# Patient Record
Sex: FEMALE | Race: OTHER | HISPANIC OR LATINO
[De-identification: names, ages, dates, MRNs, and addresses within clinical notes are randomized per-mention and may not be internally consistent; named-entity substitution may affect disease eponyms.]

---

## 2019-03-09 ENCOUNTER — APPOINTMENT (OUTPATIENT)
Dept: ULTRASOUND IMAGING | Facility: HOSPITAL | Age: 44
End: 2019-03-09

## 2019-03-09 ENCOUNTER — OUTPATIENT (OUTPATIENT)
Dept: OUTPATIENT SERVICES | Facility: HOSPITAL | Age: 44
LOS: 1 days | End: 2019-03-09
Payer: COMMERCIAL

## 2019-03-09 PROCEDURE — 76830 TRANSVAGINAL US NON-OB: CPT | Mod: 26

## 2019-03-09 PROCEDURE — 76856 US EXAM PELVIC COMPLETE: CPT | Mod: 26

## 2019-03-09 PROCEDURE — 76830 TRANSVAGINAL US NON-OB: CPT

## 2019-03-09 PROCEDURE — 76856 US EXAM PELVIC COMPLETE: CPT

## 2019-03-11 VITALS
WEIGHT: 172.84 LBS | HEART RATE: 75 BPM | HEIGHT: 68 IN | OXYGEN SATURATION: 98 % | TEMPERATURE: 99 F | SYSTOLIC BLOOD PRESSURE: 98 MMHG | DIASTOLIC BLOOD PRESSURE: 65 MMHG | RESPIRATION RATE: 16 BRPM

## 2019-03-11 NOTE — ASU PATIENT PROFILE, ADULT - PSH
Other postprocedural status  S/P appendectomy  Other postprocedural status  S/P myomectomy  Other postprocedural status  x3

## 2019-03-11 NOTE — ASU PREOP CHECKLIST - SELECT TESTS ORDERED
Type and Screen/HCG Type and Screen/POC HCG negative/HCG Type and Screen/HCG/CMP/POC HCG negative, pelvic sono/CBC/PT/PTT/INR Type and Screen/HCG/POC HCG negative, pelvic sonogram, pelvic CT/CMP/CBC/PT/PTT/INR

## 2019-03-12 ENCOUNTER — RESULT REVIEW (OUTPATIENT)
Age: 44
End: 2019-03-12

## 2019-03-12 ENCOUNTER — INPATIENT (INPATIENT)
Facility: HOSPITAL | Age: 44
LOS: 0 days | Discharge: HOME | DRG: 743 | End: 2019-03-13
Attending: OBSTETRICS & GYNECOLOGY | Admitting: OBSTETRICS & GYNECOLOGY
Payer: COMMERCIAL

## 2019-03-12 RX ORDER — ACETAMINOPHEN 500 MG
1000 TABLET ORAL EVERY 6 HOURS
Qty: 0 | Refills: 0 | Status: DISCONTINUED | OUTPATIENT
Start: 2019-03-12 | End: 2019-03-13

## 2019-03-12 RX ORDER — SIMETHICONE 80 MG/1
80 TABLET, CHEWABLE ORAL EVERY 8 HOURS
Qty: 0 | Refills: 0 | Status: DISCONTINUED | OUTPATIENT
Start: 2019-03-12 | End: 2019-03-13

## 2019-03-12 RX ORDER — DOCUSATE SODIUM 100 MG
100 CAPSULE ORAL THREE TIMES A DAY
Qty: 0 | Refills: 0 | Status: DISCONTINUED | OUTPATIENT
Start: 2019-03-12 | End: 2019-03-13

## 2019-03-12 RX ORDER — OXYCODONE HYDROCHLORIDE 5 MG/1
5 TABLET ORAL EVERY 6 HOURS
Qty: 0 | Refills: 0 | Status: DISCONTINUED | OUTPATIENT
Start: 2019-03-12 | End: 2019-03-13

## 2019-03-12 RX ORDER — SODIUM CHLORIDE 9 MG/ML
1000 INJECTION, SOLUTION INTRAVENOUS
Qty: 0 | Refills: 0 | Status: DISCONTINUED | OUTPATIENT
Start: 2019-03-12 | End: 2019-03-13

## 2019-03-12 RX ORDER — ONDANSETRON 8 MG/1
4 TABLET, FILM COATED ORAL EVERY 6 HOURS
Qty: 0 | Refills: 0 | Status: DISCONTINUED | OUTPATIENT
Start: 2019-03-12 | End: 2019-03-13

## 2019-03-12 RX ORDER — HYDROMORPHONE HYDROCHLORIDE 2 MG/ML
0.5 INJECTION INTRAMUSCULAR; INTRAVENOUS; SUBCUTANEOUS
Qty: 0 | Refills: 0 | Status: DISCONTINUED | OUTPATIENT
Start: 2019-03-12 | End: 2019-03-12

## 2019-03-12 RX ORDER — KETOROLAC TROMETHAMINE 30 MG/ML
30 SYRINGE (ML) INJECTION EVERY 6 HOURS
Qty: 0 | Refills: 0 | Status: DISCONTINUED | OUTPATIENT
Start: 2019-03-12 | End: 2019-03-13

## 2019-03-12 RX ORDER — BUPIVACAINE 13.3 MG/ML
20 INJECTION, SUSPENSION, LIPOSOMAL INFILTRATION ONCE
Qty: 0 | Refills: 0 | Status: DISCONTINUED | OUTPATIENT
Start: 2019-03-12 | End: 2019-03-13

## 2019-03-12 RX ADMIN — HYDROMORPHONE HYDROCHLORIDE 0.5 MILLIGRAM(S): 2 INJECTION INTRAMUSCULAR; INTRAVENOUS; SUBCUTANEOUS at 15:53

## 2019-03-12 RX ADMIN — Medication 100 MILLIGRAM(S): at 21:35

## 2019-03-12 RX ADMIN — SIMETHICONE 80 MILLIGRAM(S): 80 TABLET, CHEWABLE ORAL at 21:35

## 2019-03-12 RX ADMIN — OXYCODONE HYDROCHLORIDE 5 MILLIGRAM(S): 5 TABLET ORAL at 19:36

## 2019-03-12 RX ADMIN — Medication 1000 MILLIGRAM(S): at 21:35

## 2019-03-12 RX ADMIN — Medication 1000 MILLIGRAM(S): at 23:31

## 2019-03-12 RX ADMIN — HYDROMORPHONE HYDROCHLORIDE 0.5 MILLIGRAM(S): 2 INJECTION INTRAMUSCULAR; INTRAVENOUS; SUBCUTANEOUS at 15:19

## 2019-03-12 RX ADMIN — Medication 30 MILLIGRAM(S): at 23:31

## 2019-03-12 RX ADMIN — OXYCODONE HYDROCHLORIDE 5 MILLIGRAM(S): 5 TABLET ORAL at 21:09

## 2019-03-12 RX ADMIN — Medication 30 MILLIGRAM(S): at 21:35

## 2019-03-12 NOTE — PROGRESS NOTE ADULT - SUBJECTIVE AND OBJECTIVE BOX
GYN POST OP CHECK      Patient evaluated at the bedside. No acute events.  Denies CP/SOB/dizziness/nausea/vomiting/calf pain. Patient is reporting mildly increased pain. She is tolerating sips but has not yet voided or passed gas. Patient has no other complaints at this time.     Vital Signs Last 24 Hrs  T(C): 36.9 (12 Mar 2019 18:19), Max: 37.3 (12 Mar 2019 16:56)  T(F): 98.5 (12 Mar 2019 18:19), Max: 99.1 (12 Mar 2019 16:56)  HR: 80 (12 Mar 2019 18:19) (74 - 86)  BP: 107/66 (12 Mar 2019 18:19) (107/66 - 121/60)  BP(mean): 79 (12 Mar 2019 16:56) (76 - 85)  RR: 16 (12 Mar 2019 18:19) (10 - 19)  SpO2: 94% (12 Mar 2019 18:19) (94% - 99%)    Physical Exam:  Gen: No Acute Distress  Pulm: Clear to auscultation bilaterally  GI: soft, appropriately tender, mildly distended, decreased BS, no rebound, no guarding.  Dressing C/D/I  : Andujar in place  Ext: SCDs in place, wnl    I&O's Summary    12 Mar 2019 07:01  -  12 Mar 2019 19:23  --------------------------------------------------------  IN: 375 mL / OUT: 0 mL / NET: 375 mL      MEDICATIONS  (STANDING):  acetaminophen   Tablet .. 1000 milliGRAM(s) Oral every 6 hours  BUpivacaine liposome 1.3% Injectable (no eMAR) 20 milliLiter(s) Local Injection once  docusate sodium 100 milliGRAM(s) Oral three times a day  ketorolac   Injectable 30 milliGRAM(s) IV Push every 6 hours  lactated ringers. 1000 milliLiter(s) (125 mL/Hr) IV Continuous <Continuous>  simethicone 80 milliGRAM(s) Chew every 8 hours    MEDICATIONS  (PRN):  HYDROmorphone  Injectable 0.5 milliGRAM(s) IV Push every 15 minutes PRN Severe Pain (7 - 10)  ondansetron Injectable 4 milliGRAM(s) IV Push every 6 hours PRN Nausea and/or Vomiting  oxyCODONE    IR 5 milliGRAM(s) Oral every 6 hours PRN Moderate Pain (4 - 6)    Allergies    penicillin (Rash)  Zyrtec (Unknown)    Intolerances        LABS:                    O:   T(C): 36.9 (03-12-19 @ 18:19), Max: 37.3 (03-12-19 @ 16:56)  HR: 80 (03-12-19 @ 18:19) (74 - 86)  BP: 107/66 (03-12-19 @ 18:19) (107/66 - 121/60)  RR: 16 (03-12-19 @ 18:19) (10 - 19)  SpO2: 94% (03-12-19 @ 18:19) (94% - 99%)  Wt(kg): --    GEN: patient appears well  LUNGS: no respiratory distress  ABD:  Pelvic:   EXT: no calf tenderness        03-12 @ 07:01  -  03-12 @ 19:21  --------------------------------------------------------  IN: 375 mL / OUT: 0 mL / NET: 375 mL              Assessment and Plan:     1. ID:   2. FEN/GI - Regular diet as tolerated.   3. PAIN - Controlled with oral pain medications  4.  - Voiding spontaneously.    5. RESP -  No acute issues.   6. VTE prophylaxis - SCDs, ambulate as tolerated.   7. LABS -    8. DISPO -

## 2019-03-12 NOTE — BRIEF OPERATIVE NOTE - OPERATION/FINDINGS
Significant adhesions of the bowel to the anterior abdominal wall, pelvic side wall and uterus. Large, fibroid uterus. Dilated left fallopian tube. Left ovary significantly adhered to pelvic side wall. Left ovarian cyst identified with intraoperative laparoscopic US.

## 2019-03-12 NOTE — BRIEF OPERATIVE NOTE - NSICDXBRIEFPOSTOP_GEN_ALL_CORE_FT
POST-OP DIAGNOSIS:  Pelvic adhesions 12-Mar-2019 20:51:06  Amber Magana  Cyst, ovarian 12-Mar-2019 20:50:51 left Amber Magana  Hydrosalpinx 12-Mar-2019 20:50:19 left Amber Magana

## 2019-03-12 NOTE — PROGRESS NOTE ADULT - SUBJECTIVE AND OBJECTIVE BOX
POST-OPERATIVE NOTE    Procedure: Diagnostic Laparotomy, lysis of pelvic adhesions,     Diagnosis/Indication: Pelvic adhesions, Ovarian cyst    Surgeon: Dr. Remy    S: Pt has no complaints. Denies CP, SOB, MCCLENDON, calf tenderness. Pain controlled with medication. Denies nausea, vomiting.    O:  T(C): 37.1 (03-12-19 @ 21:00), Max: 37.1 (03-12-19 @ 21:00)  T(F): 98.8 (03-12-19 @ 21:00), Max: 98.8 (03-12-19 @ 21:00)  HR: 79 (03-12-19 @ 21:00) (79 - 79)  BP: 100/63 (03-12-19 @ 21:00) (100/63 - 100/63)  RR: 16 (03-12-19 @ 21:00) (16 - 16)  SpO2: 98% (03-12-19 @ 21:00) (98% - 98%)  Wt(kg): --    Gen: NAD, resting comfortably in bed  C/V: NSR  Pulm: Nonlabored breathing, no respiratory distress  Abd: soft, NT/ND, incision areas are clean, dry and intact  Extrem: WWP, no calf edema or tenderness, SCDs in place    A/P: 43y Female s/p above procedure  Diet: CLD  IVF: LR @ 125cc/hr  Pain/nausea control  SQH/SCDs/OOBA/IS  Dispo pending pain control, PO tolerance, clinical improvement

## 2019-03-12 NOTE — BRIEF OPERATIVE NOTE - NSICDXBRIEFPROCEDURE_GEN_ALL_CORE_FT
PROCEDURES:  Laparoscopic ovarian cystectomy 12-Mar-2019 20:49:44  Amber Magana  Salpingectomy, left 12-Mar-2019 20:49:35  Amber Magana  Laparoscopy, diagnostic, female 12-Mar-2019 20:49:21  Amber Magana  Lysis of pelvic adhesions 12-Mar-2019 20:49:08  Amber Magana

## 2019-03-13 ENCOUNTER — TRANSCRIPTION ENCOUNTER (OUTPATIENT)
Age: 44
End: 2019-03-13

## 2019-03-13 VITALS
RESPIRATION RATE: 17 BRPM | TEMPERATURE: 98 F | OXYGEN SATURATION: 99 % | HEART RATE: 61 BPM | SYSTOLIC BLOOD PRESSURE: 109 MMHG | DIASTOLIC BLOOD PRESSURE: 70 MMHG

## 2019-03-13 LAB
BASOPHILS # BLD AUTO: 0.05 K/UL — SIGNIFICANT CHANGE UP (ref 0–0.2)
BASOPHILS NFR BLD AUTO: 0.5 % — SIGNIFICANT CHANGE UP (ref 0–2)
EOSINOPHIL # BLD AUTO: 0.1 K/UL — SIGNIFICANT CHANGE UP (ref 0–0.5)
EOSINOPHIL NFR BLD AUTO: 1.1 % — SIGNIFICANT CHANGE UP (ref 0–6)
EXTRA GREEN TOP TUBE: SIGNIFICANT CHANGE UP
HCT VFR BLD CALC: 31.7 % — LOW (ref 34.5–45)
HGB BLD-MCNC: 10.4 G/DL — LOW (ref 11.5–15.5)
IMM GRANULOCYTES NFR BLD AUTO: 0.2 % — SIGNIFICANT CHANGE UP (ref 0–1.5)
LYMPHOCYTES # BLD AUTO: 1.98 K/UL — SIGNIFICANT CHANGE UP (ref 1–3.3)
LYMPHOCYTES # BLD AUTO: 21.2 % — SIGNIFICANT CHANGE UP (ref 13–44)
MCHC RBC-ENTMCNC: 32.8 GM/DL — SIGNIFICANT CHANGE UP (ref 32–36)
MCHC RBC-ENTMCNC: 34 PG — SIGNIFICANT CHANGE UP (ref 27–34)
MCV RBC AUTO: 103.6 FL — HIGH (ref 80–100)
MONOCYTES # BLD AUTO: 0.82 K/UL — SIGNIFICANT CHANGE UP (ref 0–0.9)
MONOCYTES NFR BLD AUTO: 8.8 % — SIGNIFICANT CHANGE UP (ref 2–14)
NEUTROPHILS # BLD AUTO: 6.39 K/UL — SIGNIFICANT CHANGE UP (ref 1.8–7.4)
NEUTROPHILS NFR BLD AUTO: 68.2 % — SIGNIFICANT CHANGE UP (ref 43–77)
NRBC # BLD: 0 /100 WBCS — SIGNIFICANT CHANGE UP (ref 0–0)
PLATELET # BLD AUTO: 266 K/UL — SIGNIFICANT CHANGE UP (ref 150–400)
RBC # BLD: 3.06 M/UL — LOW (ref 3.8–5.2)
RBC # FLD: 12.9 % — SIGNIFICANT CHANGE UP (ref 10.3–14.5)
WBC # BLD: 9.36 K/UL — SIGNIFICANT CHANGE UP (ref 3.8–10.5)
WBC # FLD AUTO: 9.36 K/UL — SIGNIFICANT CHANGE UP (ref 3.8–10.5)

## 2019-03-13 RX ADMIN — Medication 1000 MILLIGRAM(S): at 10:00

## 2019-03-13 RX ADMIN — Medication 30 MILLIGRAM(S): at 03:20

## 2019-03-13 RX ADMIN — Medication 1000 MILLIGRAM(S): at 03:20

## 2019-03-13 RX ADMIN — Medication 30 MILLIGRAM(S): at 05:07

## 2019-03-13 RX ADMIN — Medication 1000 MILLIGRAM(S): at 05:07

## 2019-03-13 RX ADMIN — Medication 30 MILLIGRAM(S): at 09:15

## 2019-03-13 RX ADMIN — Medication 30 MILLIGRAM(S): at 09:00

## 2019-03-13 RX ADMIN — Medication 1000 MILLIGRAM(S): at 09:00

## 2019-03-13 RX ADMIN — ONDANSETRON 4 MILLIGRAM(S): 8 TABLET, FILM COATED ORAL at 00:32

## 2019-03-13 RX ADMIN — OXYCODONE HYDROCHLORIDE 5 MILLIGRAM(S): 5 TABLET ORAL at 01:38

## 2019-03-13 RX ADMIN — Medication 100 MILLIGRAM(S): at 06:43

## 2019-03-13 RX ADMIN — SIMETHICONE 80 MILLIGRAM(S): 80 TABLET, CHEWABLE ORAL at 03:21

## 2019-03-13 RX ADMIN — SIMETHICONE 80 MILLIGRAM(S): 80 TABLET, CHEWABLE ORAL at 12:36

## 2019-03-13 RX ADMIN — OXYCODONE HYDROCHLORIDE 5 MILLIGRAM(S): 5 TABLET ORAL at 03:19

## 2019-03-13 NOTE — DISCHARGE NOTE NURSING/CASE MANAGEMENT/SOCIAL WORK - NSDCDPATPORTLINK_GEN_ALL_CORE
You can access the [facility] Patient Portal, offered by [facility], by registering with the following website: [email or web address]

## 2019-03-13 NOTE — DISCHARGE NOTE PROVIDER - NSDCFUADDINST_GEN_ALL_CORE_FT
- Nothing in vagina - no intercourse, tampons, or douching until cleared by your doctor.   - Avoid swimming, tub baths, strenuous activity and heavy lifting until cleared by your doctor.   - Showering is ok.   - Continue oral pain medications as needed for pain.    - Follow up in office in 1-2 weeks for your postoperative visit.  Call the office to confirm your follow up appointment.   - Call the office sooner if you develop any fever, heavy bleeding, or severe pain.  Go to the closest emergency room for any of these symptoms if you are not able to contact your doctor.

## 2019-03-13 NOTE — PROGRESS NOTE ADULT - ASSESSMENT
43 yoF s/p diagnostic laparoscopy with extensive lysis of adhesions and left salpingectomy and ovarian cystectomy.    Care per gyn  Team 1C will follow  Discussed with Dr. Remy

## 2019-03-13 NOTE — PROGRESS NOTE ADULT - SUBJECTIVE AND OBJECTIVE BOX
GYN PROGRESS NOTE    Patient evaluated at the bedside. No acute events. Patient reports that her pain is controlled but that she was not able to fall asleep until 2 am. She has been walking without assistance, voiding spontaneously and is passing flatus. Patient is tolerating clears and would like to eat a regular diet.   Denies CP/SOB/dizziness/nausea/vomiting/calf pain.    O:   T(C): 36.8 (03-13-19 @ 05:02), Max: 37.3 (03-12-19 @ 16:56)  HR: 68 (03-13-19 @ 05:02) (65 - 86)  BP: 101/65 (03-13-19 @ 05:02) (100/63 - 121/60)  RR: 17 (03-13-19 @ 05:02) (10 - 19)  SpO2: 99% (03-13-19 @ 05:02) (94% - 99%)  Wt(kg): --    GEN: patient appears well, sleeping peacefully   LUNGS: no respiratory distress  ABD: soft, appropriately tender, not distended  EXT: no calf tenderness        03-12 @ 07:01  -  03-13 @ 06:53  --------------------------------------------------------  IN: 2265 mL / OUT: 2500 mL / NET: -235 mL

## 2019-03-13 NOTE — DISCHARGE NOTE PROVIDER - HOSPITAL COURSE
Patient is status post a diagnostic laparoscopy with extensive lysis of adhesions, left salpingectomy and left ovarian cystectomy for pelvic pain. She had an uncomplicated postoperative course and has met her postoperative milestones appropriately.  Vitals are stable for discharge.

## 2019-03-13 NOTE — PROGRESS NOTE ADULT - SUBJECTIVE AND OBJECTIVE BOX
STATUS POST:  Laparoscopic ovarian cystectomy  Salpingectomy, left  Laparoscopy, diagnostic, female  Lysis of pelvic adhesions      POST OPERATIVE DAY #: 1    SUBJECTIVE:   No acute events overnight.   Patient  pain controlled, out of bed ambulating.  Tolerating CLD. Denies nausea/vomiting  +Flatus    ---------------------------------------------------------------    Vital Signs Last 24 Hrs  T(C): 36.7 (13 Mar 2019 08:43), Max: 37.3 (12 Mar 2019 16:56)  T(F): 98.1 (13 Mar 2019 08:43), Max: 99.1 (12 Mar 2019 16:56)  HR: 61 (13 Mar 2019 08:43) (61 - 86)  BP: 109/70 (13 Mar 2019 08:43) (100/63 - 121/60)  BP(mean): 79 (12 Mar 2019 16:56) (76 - 85)  RR: 17 (13 Mar 2019 08:43) (10 - 19)  SpO2: 99% (13 Mar 2019 08:43) (94% - 99%)    I&O's Summary    12 Mar 2019 07:01  -  13 Mar 2019 07:00  --------------------------------------------------------  IN: 2265 mL / OUT: 2500 mL / NET: -235 mL    13 Mar 2019 07:01  -  13 Mar 2019 13:02  --------------------------------------------------------  IN: 0 mL / OUT: 1700 mL / NET: -1700 mL        ----------------------------------------------------------------    Physical Exam:  General: NAD, Comfortable in bed     Neuro: A&Ox3  Respiratory: nonlabored breathing, no respiratory distress    Abd: soft, nontender, nondistended,  incisions clean dry intact, without erythema, drainage, or malodor   Extremities: WWP, nonedematous, SCDs in place          -------------------------------------------------------------------    LABS:                        10.4   9.36  )-----------( 266      ( 13 Mar 2019 07:13 )             31.7                   RADIOLOGY & ADDITIONAL STUDIES:

## 2019-03-13 NOTE — DISCHARGE NOTE PROVIDER - CARE PROVIDER_API CALL
Vladislav Anderson)  Obstetrics and Gynecology  [address] [place] [address], [address] [place] [address]  Phone: [phone]  Fax: [phone]  Follow Up Time:

## 2019-03-13 NOTE — PROGRESS NOTE ADULT - SUBJECTIVE AND OBJECTIVE BOX
No complaints. Pain is better now  AVSS  Lungs clear  Abdomen soft, wounds healing well  Voiding freely  Hb 10.4    Plan  OOB  Regular diet  May D/C home today

## 2019-03-13 NOTE — DISCHARGE NOTE NURSING/CASE MANAGEMENT/SOCIAL WORK - NURSING SECTION COMPLETE
Quality 226: Preventive Care And Screening: Tobacco Use: Screening And Cessation Intervention: Patient screened for tobacco and never smoked Detail Level: Detailed Patient/Caregiver provided printed discharge information.

## 2019-03-13 NOTE — PROGRESS NOTE ADULT - ASSESSMENT
Assessment and Plan: 42 yo  s/p diagnostic laparoscopy with extensive lysis of adhesions and left salpingectomy and ovarian cystectomy - POD1. Patient is clinically stable and meeting postoperative milestones appropriately.     1. ID: No acute issues.   2. FEN/GI - Passing flatus. Will advance as tolerated.   3. PAIN - Controlled with oral pain medications  4.  - Voiding spontaneously.    5. RESP -  No acute issues.   6. VTE prophylaxis - SCDs, ambulate as tolerated.   7. LABS -  Pending this morning  8. DISPO -   Likely today.

## 2019-03-13 NOTE — DISCHARGE NOTE PROVIDER - NSDCACTIVITY_GEN_ALL_CORE
Stairs allowed/Walking - Indoors allowed/No heavy lifting/straining/Return to Work/School allowed/Showering allowed

## 2019-03-14 PROCEDURE — 88304 TISSUE EXAM BY PATHOLOGIST: CPT

## 2019-03-14 PROCEDURE — 86900 BLOOD TYPING SEROLOGIC ABO: CPT

## 2019-03-14 PROCEDURE — 86850 RBC ANTIBODY SCREEN: CPT

## 2019-03-14 PROCEDURE — 85025 COMPLETE CBC W/AUTO DIFF WBC: CPT

## 2019-03-14 PROCEDURE — 36415 COLL VENOUS BLD VENIPUNCTURE: CPT

## 2019-03-14 PROCEDURE — 86901 BLOOD TYPING SEROLOGIC RH(D): CPT

## 2019-03-17 LAB — SURGICAL PATHOLOGY STUDY: SIGNIFICANT CHANGE UP

## 2019-03-22 DIAGNOSIS — Z88.0 ALLERGY STATUS TO PENICILLIN: ICD-10-CM

## 2019-03-22 DIAGNOSIS — N83.202 UNSPECIFIED OVARIAN CYST, LEFT SIDE: ICD-10-CM

## 2019-03-22 DIAGNOSIS — F17.210 NICOTINE DEPENDENCE, CIGARETTES, UNCOMPLICATED: ICD-10-CM

## 2019-03-22 DIAGNOSIS — N70.11 CHRONIC SALPINGITIS: ICD-10-CM

## 2019-03-22 DIAGNOSIS — N73.6 FEMALE PELVIC PERITONEAL ADHESIONS (POSTINFECTIVE): ICD-10-CM

## 2020-01-10 PROBLEM — N83.209 UNSPECIFIED OVARIAN CYST, UNSPECIFIED SIDE: Chronic | Status: ACTIVE | Noted: 2019-03-11

## 2020-02-27 ENCOUNTER — APPOINTMENT (OUTPATIENT)
Dept: PLASTIC SURGERY | Facility: CLINIC | Age: 45
End: 2020-02-27

## 2020-08-23 ENCOUNTER — APPOINTMENT (OUTPATIENT)
Dept: DISASTER EMERGENCY | Facility: CLINIC | Age: 45
End: 2020-08-23

## 2020-08-23 DIAGNOSIS — Z01.818 ENCOUNTER FOR OTHER PREPROCEDURAL EXAMINATION: ICD-10-CM

## 2020-08-24 ENCOUNTER — APPOINTMENT (OUTPATIENT)
Dept: COLORECTAL SURGERY | Facility: CLINIC | Age: 45
End: 2020-08-24
Payer: COMMERCIAL

## 2020-08-24 VITALS
BODY MASS INDEX: 24.59 KG/M2 | HEIGHT: 69 IN | SYSTOLIC BLOOD PRESSURE: 106 MMHG | TEMPERATURE: 97.9 F | WEIGHT: 166 LBS | DIASTOLIC BLOOD PRESSURE: 72 MMHG | HEART RATE: 79 BPM

## 2020-08-24 DIAGNOSIS — R19.4 CHANGE IN BOWEL HABIT: ICD-10-CM

## 2020-08-24 PROCEDURE — 99204 OFFICE O/P NEW MOD 45 MIN: CPT | Mod: 25

## 2020-08-24 PROCEDURE — 45300 PROCTOSIGMOIDOSCOPY DX: CPT

## 2020-08-24 RX ORDER — NEOMYCIN SULFATE 500 MG/1
500 TABLET ORAL
Qty: 6 | Refills: 0 | Status: ACTIVE | COMMUNITY
Start: 2020-08-24 | End: 1900-01-01

## 2020-08-24 RX ORDER — METRONIDAZOLE 500 MG/1
500 TABLET ORAL
Qty: 6 | Refills: 0 | Status: ACTIVE | COMMUNITY
Start: 2020-08-24 | End: 1900-01-01

## 2020-08-24 NOTE — HISTORY OF PRESENT ILLNESS
[FreeTextEntry1] : 43 yo F presents for evaluation of \par Referred by Dr. Vladislva Mckeon (GYN)\par \par PSH myomectomy 8 years ago 2/2 fibroids and appendectomy\par c/o left abdominal pain, bloating, nausea, change in BHs including constipation and bloating for the last 3 weeks. Seen by GYN, TA/TV US completed on 8/14/20:\par Impression:\par 1: upper normal size uterus with 3 discrete fibroirds\par 2: enlarged L ovary attributable to a simple follicular unilocular benign cyst and suspect small hydrosalpinx\par 3: f/u pelvic US in 10-12 weeks to verify stability vs resolution\par Scheduled for robotic myomectomy and lysis of adhesions on Wednesday at [facility]\par Reports appetite present, however has been eating less due to nausea\par Denies fever, BPR\par Has tried increasing bran intake and takes docusate two capsules once daily w/ normalization of regular BM pattern of once daily for the last two day\par BH: once daily\par Reports adequate dietary fiber and water intake\par \par Never had a colonoscopy\par Denies FMH colorectal CA, Mother w/ left sided breast CA\par Denies ASA/NSAID use

## 2020-08-24 NOTE — PHYSICAL EXAM
[Abdomen Tenderness] : ~T No ~M abdominal tenderness [Abdomen Masses] : No abdominal masses [Normal] : was normal [No HSM] : no hepatosplenomegaly [None] : there was no rectal mass  [FreeTextEntry1] : A rigid proctosigmoidoscope was passed through he anus into the rectum to 16  cm. . The mucosal surface were inspected. The patient tolerated the procedure well.\par \par The findings revealed:\par no masses or lesions.\par mild/mod internal hemorrhoids.\par

## 2020-08-24 NOTE — ASSESSMENT
[FreeTextEntry1] : I have detailed to the patient that I will be available to assist in her operation if Dr. Nissen encounters intra-abdominal adhesions or bowel involvement to his gynecological surgical site. I have discussed with her the role of possible lysis of adhesions, conversion to open surgery, and need for small or large bowel resection, and the potential need for a ileostomy or colostomy creation. I have also discussed with her the risks, benefits and alternatives of the treatment including but not limited to anastomotic leak requiring secondary procedures including ostomy creation, bleeding, infection, and the need for future procedures. Bowel preparation nstructions were given.\par \par All questions answered

## 2020-08-25 ENCOUNTER — TRANSCRIPTION ENCOUNTER (OUTPATIENT)
Age: 45
End: 2020-08-25

## 2020-08-26 ENCOUNTER — INPATIENT (INPATIENT)
Facility: HOSPITAL | Age: 45
LOS: 1 days | Discharge: HOME | DRG: 743 | End: 2020-08-28
Attending: OBSTETRICS & GYNECOLOGY | Admitting: OBSTETRICS & GYNECOLOGY
Payer: COMMERCIAL

## 2020-08-26 ENCOUNTER — RESULT REVIEW (OUTPATIENT)
Age: 45
End: 2020-08-26

## 2020-08-26 VITALS
SYSTOLIC BLOOD PRESSURE: 93 MMHG | DIASTOLIC BLOOD PRESSURE: 60 MMHG | WEIGHT: 167.33 LBS | TEMPERATURE: 97 F | RESPIRATION RATE: 16 BRPM | OXYGEN SATURATION: 99 % | HEART RATE: 66 BPM | HEIGHT: 68 IN

## 2020-08-26 DIAGNOSIS — Z98.890 OTHER SPECIFIED POSTPROCEDURAL STATES: Chronic | ICD-10-CM

## 2020-08-26 PROCEDURE — 45330 DIAGNOSTIC SIGMOIDOSCOPY: CPT | Mod: GC

## 2020-08-26 PROCEDURE — 44180 LAP ENTEROLYSIS: CPT | Mod: GC

## 2020-08-26 PROCEDURE — 88305 TISSUE EXAM BY PATHOLOGIST: CPT | Mod: 26

## 2020-08-26 RX ORDER — BUPIVACAINE 13.3 MG/ML
20 INJECTION, SUSPENSION, LIPOSOMAL INFILTRATION ONCE
Refills: 0 | Status: DISCONTINUED | OUTPATIENT
Start: 2020-08-26 | End: 2020-08-28

## 2020-08-26 RX ORDER — SODIUM CHLORIDE 9 MG/ML
1000 INJECTION, SOLUTION INTRAVENOUS
Refills: 0 | Status: DISCONTINUED | OUTPATIENT
Start: 2020-08-26 | End: 2020-08-27

## 2020-08-26 RX ORDER — HYDROMORPHONE HYDROCHLORIDE 2 MG/ML
0.5 INJECTION INTRAMUSCULAR; INTRAVENOUS; SUBCUTANEOUS
Refills: 0 | Status: DISCONTINUED | OUTPATIENT
Start: 2020-08-26 | End: 2020-08-28

## 2020-08-26 RX ORDER — ENOXAPARIN SODIUM 100 MG/ML
40 INJECTION SUBCUTANEOUS EVERY 24 HOURS
Refills: 0 | Status: DISCONTINUED | OUTPATIENT
Start: 2020-08-27 | End: 2020-08-28

## 2020-08-26 RX ORDER — KETOROLAC TROMETHAMINE 30 MG/ML
30 SYRINGE (ML) INJECTION EVERY 8 HOURS
Refills: 0 | Status: DISCONTINUED | OUTPATIENT
Start: 2020-08-26 | End: 2020-08-27

## 2020-08-26 RX ORDER — METOCLOPRAMIDE HCL 10 MG
10 TABLET ORAL EVERY 8 HOURS
Refills: 0 | Status: DISCONTINUED | OUTPATIENT
Start: 2020-08-26 | End: 2020-08-28

## 2020-08-26 RX ORDER — OXYCODONE HYDROCHLORIDE 5 MG/1
5 TABLET ORAL
Refills: 0 | Status: DISCONTINUED | OUTPATIENT
Start: 2020-08-26 | End: 2020-08-28

## 2020-08-26 RX ORDER — SIMETHICONE 80 MG/1
80 TABLET, CHEWABLE ORAL EVERY 6 HOURS
Refills: 0 | Status: DISCONTINUED | OUTPATIENT
Start: 2020-08-26 | End: 2020-08-28

## 2020-08-26 RX ORDER — ONDANSETRON 8 MG/1
8 TABLET, FILM COATED ORAL EVERY 8 HOURS
Refills: 0 | Status: DISCONTINUED | OUTPATIENT
Start: 2020-08-26 | End: 2020-08-28

## 2020-08-26 RX ORDER — HYDROMORPHONE HYDROCHLORIDE 2 MG/ML
0.5 INJECTION INTRAMUSCULAR; INTRAVENOUS; SUBCUTANEOUS
Refills: 0 | Status: DISCONTINUED | OUTPATIENT
Start: 2020-08-26 | End: 2020-08-26

## 2020-08-26 RX ORDER — MAGNESIUM SULFATE 500 MG/ML
2 VIAL (ML) INJECTION
Refills: 0 | Status: DISCONTINUED | OUTPATIENT
Start: 2020-08-26 | End: 2020-08-28

## 2020-08-26 RX ORDER — ACETAMINOPHEN 500 MG
1000 TABLET ORAL EVERY 6 HOURS
Refills: 0 | Status: DISCONTINUED | OUTPATIENT
Start: 2020-08-26 | End: 2020-08-28

## 2020-08-26 RX ADMIN — HYDROMORPHONE HYDROCHLORIDE 0.5 MILLIGRAM(S): 2 INJECTION INTRAMUSCULAR; INTRAVENOUS; SUBCUTANEOUS at 20:33

## 2020-08-26 RX ADMIN — HYDROMORPHONE HYDROCHLORIDE 0.5 MILLIGRAM(S): 2 INJECTION INTRAMUSCULAR; INTRAVENOUS; SUBCUTANEOUS at 20:16

## 2020-08-26 RX ADMIN — HYDROMORPHONE HYDROCHLORIDE 0.5 MILLIGRAM(S): 2 INJECTION INTRAMUSCULAR; INTRAVENOUS; SUBCUTANEOUS at 18:51

## 2020-08-26 RX ADMIN — Medication 30 MILLIGRAM(S): at 23:19

## 2020-08-26 RX ADMIN — Medication 1000 MILLIGRAM(S): at 23:19

## 2020-08-26 RX ADMIN — SODIUM CHLORIDE 125 MILLILITER(S): 9 INJECTION, SOLUTION INTRAVENOUS at 19:52

## 2020-08-26 RX ADMIN — HYDROMORPHONE HYDROCHLORIDE 0.5 MILLIGRAM(S): 2 INJECTION INTRAMUSCULAR; INTRAVENOUS; SUBCUTANEOUS at 20:17

## 2020-08-26 NOTE — BRIEF OPERATIVE NOTE - NSICDXBRIEFPOSTOP_GEN_ALL_CORE_FT
POST-OP DIAGNOSIS:  Fibroid uterus 26-Aug-2020 16:25:19  Carlos Arteaga  Ovarian cyst 26-Aug-2020 16:25:07  Carlos Arteaga
POST-OP DIAGNOSIS:  Fibroid uterus 26-Aug-2020 16:25:19  Carlos Arteaga  Ovarian cyst 26-Aug-2020 16:25:07  Carlos Arteaga

## 2020-08-26 NOTE — H&P ADULT - HISTORY OF PRESENT ILLNESS
43yo  with LMP  who presents for scheduled robotic assisted laparoscopic myomectomy for fibroids. 45yo  with LMP  who presents for scheduled robotic assisted laparoscopic myomectomy for fibroids and pelvic pain.    ObHx: SAB x3  GynHx: fibroids s/p prior l/s myomectomies  and ; L ovarian cyst  PMH: denies  PSH: l/s appendectomy ; l/s myomectomy , ; l/s XI   NKDA  Meds: denies  Social: smokes 1-2 cigarettes/day for 15 yrs 45yo  with LMP  who presents for scheduled robotic assisted laparoscopic myomectomy for fibroids, menorrhagia, and pelvic pain.    ObHx: SAB x3  GynHx: fibroids s/p prior l/s myomectomies  and ; L ovarian cyst  PMH: denies  PSH: l/s appendectomy ; l/s myomectomy , ; l/s XI   NKDA  Meds: denies  Social: smokes 1-2 cigarettes/day for 15 yrs

## 2020-08-26 NOTE — BRIEF OPERATIVE NOTE - NSICDXBRIEFPROCEDURE_GEN_ALL_CORE_FT
PROCEDURES:  Laparoscopic cystectomy 26-Aug-2020 18:53:11 R ovarian cyst ruptured Maggie Stout  Myomectomy, laparoscopic, 1-4 myomas 26-Aug-2020 18:52:57  Maggie Stout
PROCEDURES:  Laparoscopic enterolysis 26-Aug-2020 16:24:30  Carlos Arteaga

## 2020-08-26 NOTE — PROGRESS NOTE ADULT - ASSESSMENT
A: 43yo s/p RA l/s cystectomy, myomectomy, XI, flex sigmoidoscopy    Plan:  1. Vital signs stable, continue to monitor per protocol  2. Pain control: tylenol/toradol  3. DVT prophylaxis: SCDs  4. Pulm: Incentive spirometer (at least 10 times per hour while awake)   5. GI: Diet regular   6. : s/p benitez, voiding spontaneously   7. Follow up labs: AM CBC  8. Activity: bedrest tonight

## 2020-08-26 NOTE — H&P ADULT - ASSESSMENT
A/P: 43yo  with LMP  who presents for scheduled robotic assisted laparoscopic myomectomy for fibroids and pelvic pain, now POD0 s/p laparoscopic lysis of adhesions and myomectomy. Will admit for post-op pain control and observation. Toradol/tylenol standing, oxy prn.    d/w Dr Anderson

## 2020-08-26 NOTE — H&P ADULT - NSICDXPASTSURGICALHX_GEN_ALL_CORE_FT
PAST SURGICAL HISTORY:  Other postprocedural status S/P appendectomy 1994    Other postprocedural status S/P myomectomy 2009 and 2014    S/P laparoscopic surgery lysis of adhesions 2019

## 2020-08-26 NOTE — BRIEF OPERATIVE NOTE - NSICDXBRIEFPREOP_GEN_ALL_CORE_FT
PRE-OP DIAGNOSIS:  Ovarian cyst 26-Aug-2020 16:24:53  Carlos Arteaga  Fibroid uterus 26-Aug-2020 16:24:42  Carlos Arteaga
PRE-OP DIAGNOSIS:  Ovarian cyst 26-Aug-2020 16:24:53  Carlos Arteaga  Fibroid uterus 26-Aug-2020 16:24:42  Carlos Arteaga

## 2020-08-26 NOTE — PACU DISCHARGE NOTE - COMMENTS
Patient is hemodynamically stable and reports discomfort manageable.  Meets PACU discharge criteria.  Report given to unit RN.  Patient transported via bed to unit.  Accompanied by CNAx2

## 2020-08-26 NOTE — PROGRESS NOTE ADULT - SUBJECTIVE AND OBJECTIVE BOX
GYN Post Op Check     Patient seen at bedside.  Pain controlled. Tolerating cracker and jello. Patient passed TOV, is passing gas and has been OOB. ce.  Denies CP, palpitations, SOB, fever, chills, nausea, vomiting.    Vital Signs Last 24 Hrs  T(C): 36.6 (26 Aug 2020 22:00), Max: 36.6 (26 Aug 2020 22:00)  T(F): 97.9 (26 Aug 2020 22:00), Max: 97.9 (26 Aug 2020 22:00)  HR: 80 (26 Aug 2020 22:00) (66 - 85)  BP: 113/56 (26 Aug 2020 22:00) (93/60 - 126/68)  BP(mean): 85 (26 Aug 2020 21:20) (81 - 88)  RR: 15 (26 Aug 2020 22:00) (13 - 22)  SpO2: 100% (26 Aug 2020 22:00) (99% - 100%)    Physical Exam:  Gen: No Acute Distress  Pulm: Clear to auscultation bilaterally  GI: soft, appropriately tender, mildly distended, decreased BS, no rebound, no guarding.  incisions c/d/i  Ext: SCDs in place, wnl    I&O's Summary    26 Aug 2020 07:01  -  26 Aug 2020 23:42  --------------------------------------------------------  IN: 125 mL / OUT: 300 mL / NET: -175 mL      MEDICATIONS  (STANDING):  acetaminophen   Tablet .. 1000 milliGRAM(s) Oral every 6 hours  BUpivacaine liposome 1.3% Injectable (no eMAR) 20 milliLiter(s) Local Injection once  ketorolac   Injectable 30 milliGRAM(s) IV Push every 8 hours  lactated ringers. 1000 milliLiter(s) (125 mL/Hr) IV Continuous <Continuous>  magnesium sulfate  IVPB 2 Gram(s) IV Intermittent every 1 hour    MEDICATIONS  (PRN):  acetaminophen   Tablet .. 1000 milliGRAM(s) Oral every 6 hours PRN Mild Pain (1 - 3)  HYDROmorphone  Injectable 0.5 milliGRAM(s) IV Push every 15 minutes PRN Severe Pain (7 - 10)  metoclopramide 10 milliGRAM(s) Oral every 8 hours PRN Nausea and/or Vomiting  ondansetron    Tablet 8 milliGRAM(s) Oral every 8 hours PRN Nausea and/or Vomiting  oxyCODONE    IR 5 milliGRAM(s) Oral every 3 hours PRN Moderate Pain (4 - 6)  simethicone 80 milliGRAM(s) Chew every 6 hours PRN Gas    Allergies    penicillin (Rash)  Zyrtec (Rash)    Intolerances        LABS:

## 2020-08-26 NOTE — BRIEF OPERATIVE NOTE - OPERATION/FINDINGS
Diagnostic robotic assisted laparoscopy performed which showed significant adhesions between the bowel/omentum and posterior aspect of the uterus, and anterior abdominal wall, lysis of adhesions with subsequent sigmoidoscopy and also rupture of L simple ovarian cyst performed by general surgery; laparoscopic myomectomy performed with removal of 1-2cm R fundal fibroid and 2 smaller <1cm subserosal fibroids, no posterior fibroid visualized despite MRI imaging, significant adenomyosis appreciated; R hydrosalpinx visualized encased in adhesions, lysis of adhesions performed; hemostasis achieved with bovie and also surgiflow, surgicel, interceed; , IVF 2000, UOP 500cc
Diagnostic laparoscopy revealing adhesions from sigmoid to anterior abdominal wall and omentum to pelvis with subsequent adhesiolysis.  Robotic mobilization of sigmoid colon and rectosigmoid with adhesiolysis of rectosigmoid to uterine adhesions. Subsequent flexible sigmoidoscopy revealing no mucosal injury or air leak on insufflation.    Remainder of procedure as per GYN.

## 2020-08-27 ENCOUNTER — TRANSCRIPTION ENCOUNTER (OUTPATIENT)
Age: 45
End: 2020-08-27

## 2020-08-27 LAB
HCT VFR BLD CALC: 28.2 % — LOW (ref 34.5–45)
HGB BLD-MCNC: 9.2 G/DL — LOW (ref 11.5–15.5)
MCHC RBC-ENTMCNC: 32.6 GM/DL — SIGNIFICANT CHANGE UP (ref 32–36)
MCHC RBC-ENTMCNC: 32.9 PG — SIGNIFICANT CHANGE UP (ref 27–34)
MCV RBC AUTO: 100.7 FL — HIGH (ref 80–100)
NRBC # BLD: 0 /100 WBCS — SIGNIFICANT CHANGE UP (ref 0–0)
PLATELET # BLD AUTO: 268 K/UL — SIGNIFICANT CHANGE UP (ref 150–400)
RBC # BLD: 2.8 M/UL — LOW (ref 3.8–5.2)
RBC # FLD: 12.9 % — SIGNIFICANT CHANGE UP (ref 10.3–14.5)
WBC # BLD: 10.38 K/UL — SIGNIFICANT CHANGE UP (ref 3.8–10.5)
WBC # FLD AUTO: 10.38 K/UL — SIGNIFICANT CHANGE UP (ref 3.8–10.5)

## 2020-08-27 RX ADMIN — Medication 1000 MILLIGRAM(S): at 23:57

## 2020-08-27 RX ADMIN — Medication 30 MILLIGRAM(S): at 00:19

## 2020-08-27 RX ADMIN — ENOXAPARIN SODIUM 40 MILLIGRAM(S): 100 INJECTION SUBCUTANEOUS at 06:11

## 2020-08-27 RX ADMIN — Medication 1000 MILLIGRAM(S): at 18:34

## 2020-08-27 RX ADMIN — OXYCODONE HYDROCHLORIDE 5 MILLIGRAM(S): 5 TABLET ORAL at 08:18

## 2020-08-27 RX ADMIN — Medication 1000 MILLIGRAM(S): at 00:19

## 2020-08-27 RX ADMIN — Medication 1000 MILLIGRAM(S): at 11:08

## 2020-08-27 RX ADMIN — Medication 30 MILLIGRAM(S): at 05:45

## 2020-08-27 RX ADMIN — Medication 30 MILLIGRAM(S): at 06:00

## 2020-08-27 RX ADMIN — OXYCODONE HYDROCHLORIDE 5 MILLIGRAM(S): 5 TABLET ORAL at 08:48

## 2020-08-27 RX ADMIN — Medication 30 MILLIGRAM(S): at 14:19

## 2020-08-27 RX ADMIN — Medication 1000 MILLIGRAM(S): at 17:20

## 2020-08-27 RX ADMIN — Medication 1000 MILLIGRAM(S): at 12:08

## 2020-08-27 RX ADMIN — Medication 1000 MILLIGRAM(S): at 05:45

## 2020-08-27 RX ADMIN — Medication 1000 MILLIGRAM(S): at 06:45

## 2020-08-27 RX ADMIN — Medication 30 MILLIGRAM(S): at 14:04

## 2020-08-27 NOTE — CHART NOTE - NSCHARTNOTEFT_GEN_A_CORE
Admitting Diagnosis:   Patient is a 45y old  Female who presents with a chief complaint of pain control post op (27 Aug 2020 14:22)    Consult: Yes [   ]  No [ X  ]    Reason for Initial Nutrition Assessment: LOS    PAST MEDICAL & SURGICAL HISTORY:  Heavy menses  Ovarian cyst  S/P laparoscopic surgery: lysis of adhesions 2019  Other postprocedural status: S/P appendectomy 1994  Other postprocedural status: S/P myomectomy 2009 and 2014    Current Nutrition Order: regular diet    PO Intake: Good (%) [   ]  Fair (50-75%) [ X  ] Poor (<25%) [   ]    GI Issues: pt endorses some nausea earlier this afternoon (resolved on its own, pt refusing anti-emetics), last BM 8/25 (pt endorses regular BM PTA, no bowel regimen ordered, pt prefers to drink prune juice)    Pain: pain controlled at this time    Skin Integrity: Dennis score    Labs: not available    Nutritionally Pertinent Lab Values: none at this time    Medications:  MEDICATIONS  (STANDING):  acetaminophen   Tablet .. 1000 milliGRAM(s) Oral every 6 hours  BUpivacaine liposome 1.3% Injectable (no eMAR) 20 milliLiter(s) Local Injection once  enoxaparin Injectable 40 milliGRAM(s) SubCutaneous every 24 hours  magnesium sulfate  IVPB 2 Gram(s) IV Intermittent every 1 hour    MEDICATIONS  (PRN):  acetaminophen   Tablet .. 1000 milliGRAM(s) Oral every 6 hours PRN Mild Pain (1 - 3)  HYDROmorphone  Injectable 0.5 milliGRAM(s) IV Push every 15 minutes PRN Severe Pain (7 - 10)  metoclopramide 10 milliGRAM(s) Oral every 8 hours PRN Nausea and/or Vomiting  ondansetron    Tablet 8 milliGRAM(s) Oral every 8 hours PRN Nausea and/or Vomiting  oxyCODONE    IR 5 milliGRAM(s) Oral every 3 hours PRN Moderate Pain (4 - 6)  simethicone 80 milliGRAM(s) Chew every 6 hours PRN Gas    Admitted Anthropometrics:  Ht (8/26): 172.7cm, Wt (8/26): 75.9kg, IBW: 63.6kg+/-10%, %IBW: 119%, BMI: 25.4     Nutrition Focused Physical Exam: Completed [   ]  Unable to complete [   ]-N/A    Estimated energy needs:   ABW (75.9kg) used to calculate energy needs due to pt's current body weight within % IBW.  Needs adjusted for post-op. Aim for higher end of kcal range.    7363-5481 kcal (25-30 kcal/kg ABW)  76-91gm protein (1.0-1.2gm/kg ABW)  1898-2277mL (25-30 mL/kg ABW)    Subjective: 45yo F no significant PMHx, who presents for scheduled robotic assisted laparoscopic myomectomy for fibroids and pelvic pain, s/p OR on 8/26. Pt seen resting in bed, tolerated CLD this AM, working on her lunch during assessment, had already consumed ~50% of her lunch. Pt endorses good appetite PTA, denies food allergies, pt follows a regular diet (limits meat). Pt states UBW is 150lb, but gained some weight during COVID-19, now weighing in at 167lb (8/26). RD to monitor and f/u per protocol.    Nutrition Diagnosis:  Increased nutrient need RT increased protein demand AEB post-op    Goal: Pt to meet >/=75%EER PO with good tolerance    Recommendations:  1. Recommend continue regular diet  2. Monitor labs (BMP, lytes); replete lytes prn  3. Trend weekly weights    Education: encouraged increased PO intake as tolerated with emphasis on lean protein for healing post-op; pt appeared receptive    Risk Level: High [   ] Moderate [ X  ] Low [   ]

## 2020-08-27 NOTE — PROGRESS NOTE ADULT - ASSESSMENT
A: 45yo s/p RA l/s cystectomy, myomectomy, XI, flex sigmoidoscopy  POD 1      Pain control  DVT prophylaxis: SCDs  OOBA/IS   Dietadvance as tolerated based on BF  Can be discharged if labs are WNL

## 2020-08-27 NOTE — PROGRESS NOTE ADULT - ASSESSMENT
A: 45yo POD1 s/p RA l/s cystectomy, myomectomy, XI, flex sigmoidoscopy  Post op Hb 9.2  Plan:  1. Vital signs stable, continue to monitor per protocol  2. Pain control: tylenol/toradol, oxy prn  3. DVT prophylaxis: SCDs  4. Pulm: Incentive spirometer (at least 10 times per hour while awake)   5. GI: Diet regular   6. : s/p benitez, voiding spontaneously   7. Follow up labs: AM CBC Hb 9.2  8. Activity: ambulate as tolerated  9. Dispo: when stable and meeting all post-op milestones

## 2020-08-27 NOTE — DISCHARGE NOTE PROVIDER - CARE PROVIDER_API CALL
Vladislav Anderson  OBSTETRICS AND GYNECOLOGY  [address] [place] [address], [address]  [place], [place] [address]  Phone: [phone]  Fax: [phone]  Follow Up Time:

## 2020-08-27 NOTE — DISCHARGE NOTE PROVIDER - NSDCFUADDINST_GEN_ALL_CORE_FT
Pelvic rest (nothing in vagina) x6 weeks or unless otherwise instructed by physician. Schedule follow up appointment with Dr. Anderson in 1-2 weeks. Go to nearest ED if fever >100.4 F, severe abdominal pain or heavy vaginal bleeding.   Wound care: Showering is allowed, no baths. Do not scrub incision area. Pat and dry all areas where incisions are.   Take Motrin 600mg every 6 hours or Tylenol 1000mg every 8 hours as needed for pain

## 2020-08-27 NOTE — PROGRESS NOTE ADULT - SUBJECTIVE AND OBJECTIVE BOX
C/o nausea earlier, none now  Afebrile VSS  Lungs clear  Abdomen soft, slightly distended  Wounds clean and dry  Voiding freely  Ext No C/C/E  Hb 9.2    Plan  OOB  Regular diet  May D/c home in AM

## 2020-08-27 NOTE — PROGRESS NOTE ADULT - SUBJECTIVE AND OBJECTIVE BOX
Patient evaluated at bedside. She c/o mild abdominal/pelvic pain though overall well controlled with pain meds. She is ambulating, voiding, tolerating PO, and passing flatus. She was somewhat dizzy and nauseated overnight initially when first getting out of bed though is feeling better now. She denies HA, dizziness, SOB, CP, palpitations, n/v.    T(C): 37.2 (08-27-20 @ 04:47), Max: 37.2 (08-27-20 @ 04:47)  HR: 73 (08-27-20 @ 04:47) (70 - 80)  BP: 111/72 (08-27-20 @ 04:47) (110/60 - 124/59)  RR: 18 (08-27-20 @ 04:47) (13 - 19)  SpO2: 97% (08-27-20 @ 04:47) (97% - 100%)    GA: NAD, appears comfortable  Resp: normal respiratory effort  Abd: +BS, soft, appropriately tender, non-distended, no rebound or guarding  Extrem: SCDs in place, no LE edema       08-26 @ 07:01  -  08-27 @ 07:00  --------------------------------------------------------  IN: 125 mL / OUT: 600 mL / NET: -475 mL                              9.2    10.38 )-----------( 268      ( 27 Aug 2020 06:29 )             28.2     MEDICATIONS  (STANDING):  acetaminophen   Tablet .. 1000 milliGRAM(s) Oral every 6 hours  BUpivacaine liposome 1.3% Injectable (no eMAR) 20 milliLiter(s) Local Injection once  enoxaparin Injectable 40 milliGRAM(s) SubCutaneous every 24 hours  ketorolac   Injectable 30 milliGRAM(s) IV Push every 8 hours  lactated ringers. 1000 milliLiter(s) (125 mL/Hr) IV Continuous <Continuous>  magnesium sulfate  IVPB 2 Gram(s) IV Intermittent every 1 hour    MEDICATIONS  (PRN):  acetaminophen   Tablet .. 1000 milliGRAM(s) Oral every 6 hours PRN Mild Pain (1 - 3)  HYDROmorphone  Injectable 0.5 milliGRAM(s) IV Push every 15 minutes PRN Severe Pain (7 - 10)  metoclopramide 10 milliGRAM(s) Oral every 8 hours PRN Nausea and/or Vomiting  ondansetron    Tablet 8 milliGRAM(s) Oral every 8 hours PRN Nausea and/or Vomiting  oxyCODONE    IR 5 milliGRAM(s) Oral every 3 hours PRN Moderate Pain (4 - 6)  simethicone 80 milliGRAM(s) Chew every 6 hours PRN Gas

## 2020-08-27 NOTE — PROGRESS NOTE ADULT - SUBJECTIVE AND OBJECTIVE BOX
Patient was seen and examined at bedside. No acute event overnight. Feels nausea. Passing flatus. No BM. Walked yesterday but felt dizzy. Denies vomiting, chest pain, fevers.       Vital Signs Last 24 Hrs  T(C): 37.2 (27 Aug 2020 04:47), Max: 37.2 (27 Aug 2020 04:47)  T(F): 98.9 (27 Aug 2020 04:47), Max: 98.9 (27 Aug 2020 04:47)  HR: 73 (27 Aug 2020 04:47) (66 - 85)  BP: 111/72 (27 Aug 2020 04:47) (93/60 - 126/68)  BP(mean): 85 (26 Aug 2020 21:20) (81 - 88)  RR: 18 (27 Aug 2020 04:47) (13 - 22)  SpO2: 97% (27 Aug 2020 04:47) (97% - 100%)    Physical Exam:  General: NAD  Pulmonary: Nonlabored breathing, no respiratory distress  Cardiovascular: NSR  Abdominal: soft, ND, tender to palpation on the LLQ  Extremities: WWP, normal strength, no clubbing/cyanosis/edema  Neuro: A/O x3, no focal deficits, normal sensation  Pulses: palpable distal pulses    Lines/drains/tubes:    I&O's Summary    26 Aug 2020 07:01  -  27 Aug 2020 07:00  --------------------------------------------------------  IN: 125 mL / OUT: 600 mL / NET: -475 mL        LABS:                        9.2    10.38 )-----------( 268      ( 27 Aug 2020 06:29 )             28.2               CAPILLARY BLOOD GLUCOSE            RADIOLOGY & ADDITIONAL STUDIES:

## 2020-08-27 NOTE — DISCHARGE NOTE PROVIDER - HOSPITAL COURSE
45 y/o F s/p  Robotic assisted laparoscopic cystectomy, Myomectomy, XI, flex sigmoidoscopy on 8/26.  Surgery was uncomlicated. Pt hemodynamically stable at the time of discharge.

## 2020-08-28 ENCOUNTER — TRANSCRIPTION ENCOUNTER (OUTPATIENT)
Age: 45
End: 2020-08-28

## 2020-08-28 VITALS
DIASTOLIC BLOOD PRESSURE: 68 MMHG | TEMPERATURE: 98 F | SYSTOLIC BLOOD PRESSURE: 110 MMHG | RESPIRATION RATE: 16 BRPM | OXYGEN SATURATION: 98 % | HEART RATE: 73 BPM

## 2020-08-28 PROCEDURE — S2900: CPT

## 2020-08-28 PROCEDURE — 85027 COMPLETE CBC AUTOMATED: CPT

## 2020-08-28 PROCEDURE — C1765: CPT

## 2020-08-28 PROCEDURE — 86850 RBC ANTIBODY SCREEN: CPT

## 2020-08-28 PROCEDURE — C1889: CPT

## 2020-08-28 PROCEDURE — 86901 BLOOD TYPING SEROLOGIC RH(D): CPT

## 2020-08-28 PROCEDURE — 88305 TISSUE EXAM BY PATHOLOGIST: CPT

## 2020-08-28 PROCEDURE — 36415 COLL VENOUS BLD VENIPUNCTURE: CPT

## 2020-08-28 RX ORDER — FAMOTIDINE 10 MG/ML
20 INJECTION INTRAVENOUS DAILY
Refills: 0 | Status: DISCONTINUED | OUTPATIENT
Start: 2020-08-28 | End: 2020-08-28

## 2020-08-28 RX ADMIN — Medication 1000 MILLIGRAM(S): at 06:27

## 2020-08-28 RX ADMIN — Medication 1000 MILLIGRAM(S): at 05:28

## 2020-08-28 RX ADMIN — ENOXAPARIN SODIUM 40 MILLIGRAM(S): 100 INJECTION SUBCUTANEOUS at 06:23

## 2020-08-28 RX ADMIN — Medication 1000 MILLIGRAM(S): at 00:57

## 2020-08-28 RX ADMIN — FAMOTIDINE 20 MILLIGRAM(S): 10 INJECTION INTRAVENOUS at 10:13

## 2020-08-28 NOTE — PROGRESS NOTE ADULT - ASSESSMENT
A: 43yo POD2 s/p RA l/s cystectomy, myomectomy, XI, flex sigmoidoscopy  Post op Hb 9.2  Plan:  1. Vital signs stable, continue to monitor per protocol  2. Pain control: tylenol/toradol, oxy prn  3. DVT prophylaxis: SCDs  4. Pulm: Incentive spirometer (at least 10 times per hour while awake)   5. GI: Diet regular   6. : s/p benitez, voiding spontaneously   7. Follow up labs: AM CBC Hb 9.2, no more labs per attending  8. Activity: ambulate as tolerated  9. Dispo: when stable and meeting all post-op milestones

## 2020-08-28 NOTE — DISCHARGE NOTE NURSING/CASE MANAGEMENT/SOCIAL WORK - PATIENT PORTAL LINK FT
You can access the FollowMyHealth Patient Portal offered by [facility] by registering at the following website: [email or web address]. By joining [employer]’s FollowMyHealth portal, you will also be able to view your health information using other applications (apps) compatible with our system.

## 2020-08-28 NOTE — PROGRESS NOTE ADULT - REASON FOR ADMISSION
pain control post op

## 2020-08-28 NOTE — PROGRESS NOTE ADULT - SUBJECTIVE AND OBJECTIVE BOX
Patient evaluated at bedside. She says she is feeling much better. Pain is well controlled and she is ambulating, voiding, passing gas, tolerating PO, and had a bowel movement yesterday. She denies HA, dizziness, SOB, CP, palpitations, n/v.    T(C): 36.8 (08-28-20 @ 05:20), Max: 37 (08-27-20 @ 20:29)  HR: 73 (08-28-20 @ 05:20) (73 - 86)  BP: 98/66 (08-28-20 @ 05:20) (97/60 - 98/66)  RR: 16 (08-28-20 @ 05:20) (16 - 16)  SpO2: 97% (08-28-20 @ 05:20) (95% - 97%)    GA: NAD, sitting up in a chair  Resp: normal respiratory effort  Abd: +BS, soft, appropriately tender, non-distended, no rebound or guarding, incision sites c/d/i  Extrem: SCDs in place, no LE edema                               9.2    10.38 )-----------( 268      ( 27 Aug 2020 06:29 )             28.2         MEDICATIONS  (STANDING):  acetaminophen   Tablet .. 1000 milliGRAM(s) Oral every 6 hours  BUpivacaine liposome 1.3% Injectable (no eMAR) 20 milliLiter(s) Local Injection once  enoxaparin Injectable 40 milliGRAM(s) SubCutaneous every 24 hours  magnesium sulfate  IVPB 2 Gram(s) IV Intermittent every 1 hour    MEDICATIONS  (PRN):  acetaminophen   Tablet .. 1000 milliGRAM(s) Oral every 6 hours PRN Mild Pain (1 - 3)  HYDROmorphone  Injectable 0.5 milliGRAM(s) IV Push every 15 minutes PRN Severe Pain (7 - 10)  metoclopramide 10 milliGRAM(s) Oral every 8 hours PRN Nausea and/or Vomiting  ondansetron    Tablet 8 milliGRAM(s) Oral every 8 hours PRN Nausea and/or Vomiting  oxyCODONE    IR 5 milliGRAM(s) Oral every 3 hours PRN Moderate Pain (4 - 6)  simethicone 80 milliGRAM(s) Chew every 6 hours PRN Gas

## 2020-09-04 LAB — SURGICAL PATHOLOGY STUDY: SIGNIFICANT CHANGE UP

## 2020-09-08 DIAGNOSIS — F17.210 NICOTINE DEPENDENCE, CIGARETTES, UNCOMPLICATED: ICD-10-CM

## 2020-09-08 DIAGNOSIS — D25.9 LEIOMYOMA OF UTERUS, UNSPECIFIED: ICD-10-CM

## 2020-09-08 DIAGNOSIS — R10.2 PELVIC AND PERINEAL PAIN: ICD-10-CM

## 2020-09-08 DIAGNOSIS — N73.6 FEMALE PELVIC PERITONEAL ADHESIONS (POSTINFECTIVE): ICD-10-CM

## 2020-09-08 DIAGNOSIS — G89.18 OTHER ACUTE POSTPROCEDURAL PAIN: ICD-10-CM

## 2020-09-08 DIAGNOSIS — Z88.0 ALLERGY STATUS TO PENICILLIN: ICD-10-CM

## 2020-09-08 DIAGNOSIS — N83.8 OTHER NONINFLAMMATORY DISORDERS OF OVARY, FALLOPIAN TUBE AND BROAD LIGAMENT: ICD-10-CM

## 2020-09-08 DIAGNOSIS — N83.209 UNSPECIFIED OVARIAN CYST, UNSPECIFIED SIDE: ICD-10-CM

## 2020-09-08 DIAGNOSIS — N70.11 CHRONIC SALPINGITIS: ICD-10-CM

## 2021-02-01 NOTE — DISCHARGE NOTE PROVIDER - NSDCCAREPROVSEEN_GEN_ALL_CORE_FT
Vladislav Anderson Ftsg Text: The defect edges were debeveled with a #15 scalpel blade.  Given the location of the defect, shape of the defect and the proximity to free margins a full thickness skin graft was deemed most appropriate.  Using a sterile surgical marker, the primary defect shape was transferred to the donor site. The area thus outlined was incised deep to adipose tissue with a #15 scalpel blade.  The harvested graft was then trimmed of adipose tissue until only dermis and epidermis was left.  The skin margins of the secondary defect were undermined to an appropriate distance in all directions utilizing iris scissors.  The secondary defect was closed with interrupted buried subcutaneous sutures.  The skin edges were then re-apposed with running  sutures.  The skin graft was then placed in the primary defect and oriented appropriately.

## 2021-03-02 ENCOUNTER — EMERGENCY (EMERGENCY)
Facility: HOSPITAL | Age: 46
LOS: 1 days | Discharge: HOME | End: 2021-03-02
Attending: EMERGENCY MEDICINE | Admitting: EMERGENCY MEDICINE
Payer: OTHER MISCELLANEOUS

## 2021-03-02 VITALS
TEMPERATURE: 98 F | WEIGHT: 166.89 LBS | DIASTOLIC BLOOD PRESSURE: 76 MMHG | HEIGHT: 68 IN | OXYGEN SATURATION: 99 % | HEART RATE: 69 BPM | SYSTOLIC BLOOD PRESSURE: 126 MMHG | RESPIRATION RATE: 18 BRPM

## 2021-03-02 VITALS
SYSTOLIC BLOOD PRESSURE: 120 MMHG | RESPIRATION RATE: 18 BRPM | DIASTOLIC BLOOD PRESSURE: 60 MMHG | OXYGEN SATURATION: 99 % | TEMPERATURE: 98 F | HEART RATE: 65 BPM
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
## 2021-03-02 DIAGNOSIS — S09.93XA UNSPECIFIED INJURY OF FACE, INITIAL ENCOUNTER: ICD-10-CM
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
## 2021-03-02 DIAGNOSIS — S09.90XA UNSPECIFIED INJURY OF HEAD, INITIAL ENCOUNTER: ICD-10-CM

## 2021-03-02 DIAGNOSIS — Z98.890 OTHER SPECIFIED POSTPROCEDURAL STATES: Chronic | ICD-10-CM

## 2021-03-02 DIAGNOSIS — Z88.0 ALLERGY STATUS TO PENICILLIN: ICD-10-CM

## 2021-03-02 PROBLEM — N92.0 EXCESSIVE AND FREQUENT MENSTRUATION WITH REGULAR CYCLE: Chronic | Status: ACTIVE | Noted: 2020-08-26

## 2021-03-02 PROCEDURE — 99284 EMERGENCY DEPT VISIT MOD MDM: CPT | Mod: 25

## 2021-03-02 PROCEDURE — 72125 CT NECK SPINE W/O DYE: CPT

## 2021-03-02 PROCEDURE — 70450 CT HEAD/BRAIN W/O DYE: CPT | Mod: 26

## 2021-03-02 PROCEDURE — G1004: CPT

## 2021-03-02 PROCEDURE — 71045 X-RAY EXAM CHEST 1 VIEW: CPT

## 2021-03-02 PROCEDURE — 71045 X-RAY EXAM CHEST 1 VIEW: CPT | Mod: 26

## 2021-03-02 PROCEDURE — 70486 CT MAXILLOFACIAL W/O DYE: CPT

## 2021-03-02 PROCEDURE — 70450 CT HEAD/BRAIN W/O DYE: CPT

## 2021-03-02 PROCEDURE — 70486 CT MAXILLOFACIAL W/O DYE: CPT | Mod: 26,MG

## 2021-03-02 PROCEDURE — 99285 EMERGENCY DEPT VISIT HI MDM: CPT

## 2021-03-02 PROCEDURE — 72125 CT NECK SPINE W/O DYE: CPT | Mod: 26

## 2021-03-02 RX ORDER — ONDANSETRON 8 MG/1
1 TABLET, FILM COATED ORAL
Qty: 8 | Refills: 0
Start: 2021-03-02

## 2021-03-02 NOTE — ED PROVIDER NOTE - OBJECTIVE STATEMENT
46 y/o F pt with no pertinent PMHx and PSHx presents to ED c/o L head, neck, and shoulder pain today. Pt works as an  and a plastic covid divider in the office fell and hit her face. Pt has blurry vision at baseline and wears glasses, but does not currently have her glasses here. Pt denies pain with eye movement, floaters, flashes, falling sensation over eyes, nausea and vomiting, anticoagulant use, other injuries, shortness of breath, chest pain, or any other acute complaints. 44 y/o F pt with no pertinent PMHx and PSHx presents to ED c/o L head, neck, and shoulder pain today. Pt works as an  and a plastic covid divider in the office fell and hit her face. Pt has blurry vision at baseline and wears glasses, but does not currently have her glasses here. Pt denies pain with eye movement, floaters, flashes, falling curtain sensation over eyes, nausea and vomiting, anticoagulant use, other injuries, shortness of breath, chest pain, or any other acute complaints.

## 2021-03-02 NOTE — ED PROVIDER NOTE - NSFOLLOWUPINSTRUCTIONS_ED_ALL_ED_FT
Please follow up with Dr. Godoy tomorrow. Please follow up with Dr. Godoy tomorrow.    Can take tylenol 650mg AND/OR motrin 600mg every 6hrs as needed for pain.  Stay well hydrated.  Follow up with primary doctor within 1-2 days.  Follow up with neurologist. Can call [phone] to schedule appointment.   Return to ER for persistent fever/vomit, uncontrolled pain, focal weakness/numbness.    Post-Concussion Syndrome    A concussion is a brain injury from a direct hit (blow) to your head or body. This blow causes your brain to shake quickly back and forth inside your skull. This can damage brain cells and cause chemical changes in your brain. Concussions are usually not life-threatening but can cause several serious symptoms.    Post-concussion syndrome is when symptoms that occur after a concussion last longer than normal. These symptoms can last from weeks to months.    What are the causes?  The cause of this condition is not known. It can happen whether your head injury was mild or severe.    What increases the risk?  You are more likely to develop this condition if:  You are female.  You are a child, teen, or young adult.  You had a past head injury.  You have a history of headaches.  You have depression or anxiety.    What are the signs or symptoms?    Physical symptoms   Headaches.  Tiredness.  Dizziness.  Weakness.  Blurry vision.  Sensitivity to light.  Hearing difficulties.  Mental and emotional symptoms     Memory difficulties.  Difficulty with concentration.  Difficulty sleeping or staying asleep.  Feeling irritable.  Anxiety or depression.  Difficulty learning new things.    How is this diagnosed?  This condition may be diagnosed based on:  Your symptoms.  A description of your injury.  Your medical history.  Your health care provider may order other tests such as:  Brain function tests (neurological testing).  CT scan.    How is this treated?  Treatment for this condition may depend on your symptoms. Symptoms usually go away on their own over time. Treatments may include:  Medicines for headaches.  Resting your brain and body for a few days after your injury.  Rehabilitation therapy, such as:  Physical or occupational therapy. This may include exercises to help with balance and dizziness.  Mental health counseling.  Speech therapy.  Vision therapy. A brain and eye specialist can recommend treatments for vision problems.    Follow these instructions at home:  Medicines     Take over-the-counter and prescription medicines only as told by your health care provider.  Avoid opioid prescription pain medicines when recovering from a concussion.  Activity     Limit your mental activities for the first few days after your injury, such as:  Homework or job-related work.  Complex thinking.  Watching TV, and using a computer or phone.  Playing memory games and puzzles.  Gradually return to your normal activity level. If a certain activity brings on your symptoms, stop or slow down until you can do the activity without it triggering your symptoms.  Limit physical activity, such as exercise or sports, for the first few days after a concussion. Gradually return to normal activity as told by your health care provider.  If a certain activity brings on your symptoms, stop or slow down until you can do the activity without it triggering your symptoms.  Rest. Rest helps your brain heal. Make sure you:  Get plenty of sleep at night. Most adults should get at least 7–9 hours of sleep each night.  Rest during the day. Take naps or rest breaks when you feel tired.  Do not do high-risk activities that could cause a second concussion, such as riding a bike or playing sports. Having another concussion before the first one has healed can be dangerous.    General instructions   Do not drink alcohol until your health care provider says you can.  Keep track of the frequency and the severity of your symptoms. Give this information to your health care provider.  Keep all follow-up visits as directed by your health care provider. This is important.  Contact a health care provider if:  Your symptoms do not improve.  You have another injury.  Get help right away if you:  Have a severe or worsening headache.  Are confused.  Have trouble staying awake.  Pass out.  Vomit.  Have weakness or numbness in any part of your body.  Have a seizure.  Have trouble speaking.  Summary  Post-concussion syndrome is when symptoms that occur after a concussion last longer than normal.  Symptoms usually go away on their own over time. Depending on your symptoms, you may need treatment, such as medicines or rehabilitation therapy.  Rest your brain and body for a few days after your injury. Gradually return to activities, as told by your health care provider.  Get plenty of sleep, and avoid alcohol and opioid pain medicines while recovering from a concussion.

## 2021-03-02 NOTE — ED ADULT TRIAGE NOTE - OTHER COMPLAINTS
Pt was at work, sitting in her office. Divider on table fell and hit pt's left side of the head and shoulder. Pt states she lost LOC. Pt states "every went back and I was very dizzy," Unknown for how long pt lost LOC. Pt now complains of left sided head  and left shoulder pain  along with dizziness. Neck collar placed by EMS

## 2021-03-02 NOTE — ED ADULT NURSE NOTE - PSH
Other postprocedural status  S/P appendectomy 1994  Other postprocedural status  S/P myomectomy 2009 and 2014  S/P laparoscopic surgery  lysis of adhesions 2019

## 2021-03-02 NOTE — ED PROVIDER NOTE - CLINICAL SUMMARY MEDICAL DECISION MAKING FREE TEXT BOX
44 y/o F pt presents with L facial injury, will obtain CT given pt's complaints, and reassess. CT positive for hyperintensity along L optic nerve, discussed with Dr. Morgan. Given normal eye exam, pt adamant to want to f/u with Dr. Polk tomorrow, will print out results and have pt f/u. Unable to do visual acuity without pt's glasses here at baseline, but pt states her blurry vision is at baseline. 46 y/o F pt presents with L facial injury, will obtain CT given pt's complaints, and reassess. CT positive for hyperintensity along L optic nerve, discussed with Dr. Morgan. Given normal eye exam and pt adamant to want to f/u with Dr. Polk tomorrow, will print out results and have pt f/u. Pt states her current vision seems baseline, does not have glasses w her.

## 2021-03-02 NOTE — ED PROVIDER NOTE - PATIENT PORTAL LINK FT
You can access the FollowMyHealth Patient Portal offered by [facility] by registering at the following website: [email or web address]. By joining [employer]’s FollowMyHealth portal, you will also be able to view your health information using other applications (apps) compatible with our system.

## 2021-03-02 NOTE — ED PROVIDER NOTE - EYES, MLM
Clear bilaterally, pupils equal, round and reactive to light. No pain with eye movement, no floaters, flashes, or falling sensation over eyes.

## 2021-03-02 NOTE — ED PROVIDER NOTE - CHPI ED SYMPTOMS NEG
no pain with eye movement, no floaters, no flashes, no chest pain, no shortness of breath/no nausea/no vomiting

## 2021-03-03 RX ORDER — ACETAMINOPHEN 500 MG
2 TABLET ORAL
Qty: 50 | Refills: 0
Start: 2021-03-03

## 2024-01-11 ENCOUNTER — APPOINTMENT (OUTPATIENT)
Dept: UROLOGY | Facility: CLINIC | Age: 49
End: 2024-01-11

## 2024-02-20 ENCOUNTER — APPOINTMENT (OUTPATIENT)
Dept: UROLOGY | Facility: CLINIC | Age: 49
End: 2024-02-20

## 2024-02-20 NOTE — HISTORY OF PRESENT ILLNESS
[FreeTextEntry1] : 47 YO F seen TODAY 2/20/2024 as NPT For urinary frequency. She had a uterine myomectomy 8/20/2020.

## 2024-03-28 ENCOUNTER — APPOINTMENT (OUTPATIENT)
Dept: UROLOGY | Facility: CLINIC | Age: 49
End: 2024-03-28

## 2024-03-28 DIAGNOSIS — R35.0 FREQUENCY OF MICTURITION: ICD-10-CM

## 2024-03-28 NOTE — HISTORY OF PRESENT ILLNESS
[FreeTextEntry1] : 49 YO F seen TODAY 3/28/2024 as NPT For urinary frequency. Cancelled 2/20/2024. She had a uterine myomectomy 8/20/2020.